# Patient Record
Sex: FEMALE | Race: WHITE | NOT HISPANIC OR LATINO | Employment: OTHER | URBAN - METROPOLITAN AREA
[De-identification: names, ages, dates, MRNs, and addresses within clinical notes are randomized per-mention and may not be internally consistent; named-entity substitution may affect disease eponyms.]

---

## 2018-01-25 ENCOUNTER — CONVERSION ENCOUNTER (OUTPATIENT)
Dept: FAMILY MEDICINE CLINIC | Facility: CLINIC | Age: 83
End: 2018-01-25

## 2018-01-25 ENCOUNTER — OFFICE VISIT CONVERTED (OUTPATIENT)
Dept: FAMILY MEDICINE CLINIC | Facility: CLINIC | Age: 83
End: 2018-01-25
Attending: FAMILY MEDICINE

## 2018-02-02 ENCOUNTER — OFFICE VISIT CONVERTED (OUTPATIENT)
Dept: FAMILY MEDICINE CLINIC | Facility: CLINIC | Age: 83
End: 2018-02-02
Attending: FAMILY MEDICINE

## 2018-03-09 ENCOUNTER — OFFICE VISIT CONVERTED (OUTPATIENT)
Dept: FAMILY MEDICINE CLINIC | Facility: CLINIC | Age: 83
End: 2018-03-09
Attending: NURSE PRACTITIONER

## 2018-08-24 ENCOUNTER — OFFICE VISIT CONVERTED (OUTPATIENT)
Dept: FAMILY MEDICINE CLINIC | Facility: CLINIC | Age: 83
End: 2018-08-24
Attending: FAMILY MEDICINE

## 2019-07-15 ENCOUNTER — HOSPITAL ENCOUNTER (OUTPATIENT)
Dept: GENERAL RADIOLOGY | Facility: HOSPITAL | Age: 84
Discharge: HOME | End: 2019-07-15
Attending: NURSE PRACTITIONER

## 2019-07-15 ENCOUNTER — OFFICE VISIT CONVERTED (OUTPATIENT)
Dept: FAMILY MEDICINE CLINIC | Facility: CLINIC | Age: 84
End: 2019-07-15
Attending: NURSE PRACTITIONER

## 2019-07-15 ENCOUNTER — HOSPITAL ENCOUNTER (OUTPATIENT)
Dept: LAB | Facility: HOSPITAL | Age: 84
Discharge: HOME | End: 2019-07-15
Attending: NURSE PRACTITIONER

## 2019-07-15 LAB
ALBUMIN SERPL-MCNC: 3.9 G/DL (ref 3.5–5)
ALBUMIN/GLOB SERPL: 1.4 {RATIO} (ref 1.4–2.6)
ALP SERPL-CCNC: 88 U/L (ref 38–185)
ALT SERPL-CCNC: 14 U/L (ref 10–40)
ANION GAP SERPL CALC-SCNC: 17 MMOL/L (ref 8–19)
AST SERPL-CCNC: 23 U/L (ref 15–50)
BASOPHILS # BLD AUTO: 0.02 10*3/UL (ref 0–0.2)
BASOPHILS NFR BLD AUTO: 0.3 % (ref 0–3)
BILIRUB SERPL-MCNC: 0.49 MG/DL (ref 0.2–1.3)
BUN SERPL-MCNC: 16 MG/DL (ref 5–25)
BUN/CREAT SERPL: 16 {RATIO} (ref 6–20)
CALCIUM SERPL-MCNC: 9.1 MG/DL (ref 8.7–10.4)
CHLORIDE SERPL-SCNC: 100 MMOL/L (ref 99–111)
CONV ABS IMM GRAN: 0.02 10*3/UL (ref 0–0.2)
CONV CO2: 26 MMOL/L (ref 22–32)
CONV IMMATURE GRAN: 0.3 % (ref 0–1.8)
CONV TOTAL PROTEIN: 6.7 G/DL (ref 6.3–8.2)
CREAT UR-MCNC: 1.02 MG/DL (ref 0.5–0.9)
DEPRECATED RDW RBC AUTO: 47.4 FL (ref 36.4–46.3)
EOSINOPHIL # BLD AUTO: 0.16 10*3/UL (ref 0–0.7)
EOSINOPHIL # BLD AUTO: 2.4 % (ref 0–7)
ERYTHROCYTE [DISTWIDTH] IN BLOOD BY AUTOMATED COUNT: 13.2 % (ref 11.7–14.4)
GFR SERPLBLD BASED ON 1.73 SQ M-ARVRAT: 47 ML/MIN/{1.73_M2}
GLOBULIN UR ELPH-MCNC: 2.8 G/DL (ref 2–3.5)
GLUCOSE SERPL-MCNC: 101 MG/DL (ref 65–99)
HBA1C MFR BLD: 13.6 G/DL (ref 12–16)
HCT VFR BLD AUTO: 40.3 % (ref 37–47)
LYMPHOCYTES # BLD AUTO: 1.64 10*3/UL (ref 1–5)
MCH RBC QN AUTO: 32.7 PG (ref 27–31)
MCHC RBC AUTO-ENTMCNC: 33.7 G/DL (ref 33–37)
MCV RBC AUTO: 96.9 FL (ref 81–99)
MONOCYTES # BLD AUTO: 0.5 10*3/UL (ref 0.2–1.2)
MONOCYTES NFR BLD AUTO: 7.5 % (ref 3–10)
NEUTROPHILS # BLD AUTO: 4.32 10*3/UL (ref 2–8)
NEUTROPHILS NFR BLD AUTO: 64.9 % (ref 30–85)
NRBC CBCN: 0 % (ref 0–0.7)
OSMOLALITY SERPL CALC.SUM OF ELEC: 289 MOSM/KG (ref 273–304)
PLATELET # BLD AUTO: 244 10*3/UL (ref 130–400)
PMV BLD AUTO: 9.8 FL (ref 9.4–12.3)
POTASSIUM SERPL-SCNC: 4.1 MMOL/L (ref 3.5–5.3)
RBC # BLD AUTO: 4.16 10*6/UL (ref 4.2–5.4)
SODIUM SERPL-SCNC: 139 MMOL/L (ref 135–147)
T4 FREE SERPL-MCNC: 1.5 NG/DL (ref 0.9–1.8)
TSH SERPL-ACNC: 2.89 M[IU]/L (ref 0.27–4.2)
VARIANT LYMPHS NFR BLD MANUAL: 24.6 % (ref 20–45)
WBC # BLD AUTO: 6.66 10*3/UL (ref 4.8–10.8)

## 2019-08-13 ENCOUNTER — CONVERSION ENCOUNTER (OUTPATIENT)
Dept: FAMILY MEDICINE CLINIC | Facility: CLINIC | Age: 84
End: 2019-08-13

## 2019-08-13 ENCOUNTER — HOSPITAL ENCOUNTER (OUTPATIENT)
Dept: FAMILY MEDICINE CLINIC | Facility: CLINIC | Age: 84
Discharge: HOME | End: 2019-08-13
Attending: NURSE PRACTITIONER

## 2019-08-13 ENCOUNTER — OFFICE VISIT CONVERTED (OUTPATIENT)
Dept: FAMILY MEDICINE CLINIC | Facility: CLINIC | Age: 84
End: 2019-08-13
Attending: NURSE PRACTITIONER

## 2019-08-15 LAB — BACTERIA UR CULT: NORMAL

## 2021-01-01 ENCOUNTER — TELEPHONE (OUTPATIENT)
Dept: FAMILY MEDICINE CLINIC | Facility: CLINIC | Age: 86
End: 2021-01-01

## 2021-01-01 ENCOUNTER — HOSPITAL ENCOUNTER (EMERGENCY)
Facility: HOSPITAL | Age: 86
End: 2021-07-06
Attending: EMERGENCY MEDICINE | Admitting: EMERGENCY MEDICINE

## 2021-01-01 VITALS
DIASTOLIC BLOOD PRESSURE: 74 MMHG | TEMPERATURE: 96.9 F | OXYGEN SATURATION: 98 % | WEIGHT: 133.5 LBS | HEART RATE: 68 BPM | SYSTOLIC BLOOD PRESSURE: 116 MMHG | RESPIRATION RATE: 17 BRPM

## 2021-01-01 VITALS
WEIGHT: 133.5 LBS | TEMPERATURE: 97 F | HEIGHT: 64 IN | OXYGEN SATURATION: 96 % | HEART RATE: 72 BPM | SYSTOLIC BLOOD PRESSURE: 107 MMHG | DIASTOLIC BLOOD PRESSURE: 74 MMHG | BODY MASS INDEX: 22.79 KG/M2

## 2021-01-01 VITALS
TEMPERATURE: 96.6 F | SYSTOLIC BLOOD PRESSURE: 119 MMHG | DIASTOLIC BLOOD PRESSURE: 62 MMHG | BODY MASS INDEX: 23.13 KG/M2 | HEIGHT: 64 IN | HEART RATE: 82 BPM | WEIGHT: 135.5 LBS | OXYGEN SATURATION: 97 % | RESPIRATION RATE: 11 BRPM

## 2021-01-01 VITALS
DIASTOLIC BLOOD PRESSURE: 58 MMHG | HEIGHT: 64 IN | TEMPERATURE: 96.1 F | BODY MASS INDEX: 22.56 KG/M2 | SYSTOLIC BLOOD PRESSURE: 93 MMHG | OXYGEN SATURATION: 97 % | WEIGHT: 132.12 LBS | HEART RATE: 75 BPM

## 2021-01-01 VITALS
HEIGHT: 64 IN | HEART RATE: 73 BPM | SYSTOLIC BLOOD PRESSURE: 86 MMHG | BODY MASS INDEX: 22.78 KG/M2 | OXYGEN SATURATION: 97 % | TEMPERATURE: 96.7 F | WEIGHT: 133.44 LBS | DIASTOLIC BLOOD PRESSURE: 66 MMHG

## 2021-01-01 VITALS
SYSTOLIC BLOOD PRESSURE: 115 MMHG | WEIGHT: 132.31 LBS | RESPIRATION RATE: 16 BRPM | OXYGEN SATURATION: 96 % | DIASTOLIC BLOOD PRESSURE: 68 MMHG | HEART RATE: 77 BPM | BODY MASS INDEX: 22.59 KG/M2 | HEIGHT: 64 IN | TEMPERATURE: 95.6 F

## 2021-01-01 VITALS — HEART RATE: 90 BPM | DIASTOLIC BLOOD PRESSURE: 58 MMHG | SYSTOLIC BLOOD PRESSURE: 90 MMHG

## 2021-01-01 DIAGNOSIS — I25.118 CORONARY ARTERY DISEASE INVOLVING NATIVE HEART WITH OTHER FORM OF ANGINA PECTORIS, UNSPECIFIED VESSEL OR LESION TYPE (HCC): ICD-10-CM

## 2021-01-01 DIAGNOSIS — I46.9 CARDIOPULMONARY ARREST (HCC): Primary | ICD-10-CM

## 2021-01-01 PROCEDURE — 25010000002 ATROPINE PER 0.01 MG

## 2021-01-01 PROCEDURE — 92950 HEART/LUNG RESUSCITATION CPR: CPT

## 2021-01-01 PROCEDURE — 99284 EMERGENCY DEPT VISIT MOD MDM: CPT

## 2021-01-01 PROCEDURE — 31500 INSERT EMERGENCY AIRWAY: CPT

## 2021-01-01 PROCEDURE — 25010000002 EPINEPHRINE 1 MG/10ML SOLUTION PREFILLED SYRINGE: Performed by: EMERGENCY MEDICINE

## 2021-01-01 PROCEDURE — 25010000002 ATROPINE PER 0.01 MG: Performed by: EMERGENCY MEDICINE

## 2021-01-01 PROCEDURE — 99283 EMERGENCY DEPT VISIT LOW MDM: CPT

## 2021-01-01 RX ORDER — ATROPINE SULFATE 1 MG/ML
INJECTION, SOLUTION INTRAMUSCULAR; INTRAVENOUS; SUBCUTANEOUS
Status: COMPLETED | OUTPATIENT
Start: 2021-01-01 | End: 2021-01-01

## 2021-01-01 RX ORDER — EPINEPHRINE 0.1 MG/ML
SYRINGE (ML) INJECTION
Status: COMPLETED | OUTPATIENT
Start: 2021-01-01 | End: 2021-01-01

## 2021-01-01 RX ORDER — LEVOTHYROXINE SODIUM 0.07 MG/1
75 TABLET ORAL DAILY
Qty: 30 TABLET | Refills: 4 | Status: SHIPPED | OUTPATIENT
Start: 2021-01-01

## 2021-01-01 RX ORDER — PANTOPRAZOLE SODIUM 20 MG/1
20 TABLET, DELAYED RELEASE ORAL DAILY
Qty: 30 TABLET | Refills: 4 | Status: SHIPPED | OUTPATIENT
Start: 2021-01-01

## 2021-01-01 RX ORDER — CALCIUM CHLORIDE 100 MG/ML
INJECTION INTRAVENOUS; INTRAVENTRICULAR
Status: COMPLETED | OUTPATIENT
Start: 2021-01-01 | End: 2021-01-01

## 2021-01-01 RX ADMIN — EPINEPHRINE 1 MG: 0.1 INJECTION, SOLUTION ENDOTRACHEAL; INTRACARDIAC; INTRAVENOUS at 17:44

## 2021-01-01 RX ADMIN — EPINEPHRINE 1 MG: 0.1 INJECTION, SOLUTION ENDOTRACHEAL; INTRACARDIAC; INTRAVENOUS at 17:37

## 2021-01-01 RX ADMIN — ATROPINE SULFATE 1 MG: 1 INJECTION, SOLUTION INTRAMUSCULAR; INTRAVENOUS; SUBCUTANEOUS at 17:34

## 2021-01-01 RX ADMIN — CALCIUM CHLORIDE 1 G: 100 INJECTION INTRAVENOUS; INTRAVENTRICULAR at 17:45

## 2021-01-01 RX ADMIN — SODIUM BICARBONATE 50 MEQ: 84 INJECTION, SOLUTION INTRAVENOUS at 17:42

## 2021-01-01 RX ADMIN — EPINEPHRINE 1 MG: 0.1 INJECTION, SOLUTION ENDOTRACHEAL; INTRACARDIAC; INTRAVENOUS at 17:35

## 2021-01-01 RX ADMIN — CALCIUM CHLORIDE 1 G: 100 INJECTION INTRAVENOUS; INTRAVENTRICULAR at 17:38

## 2021-01-01 RX ADMIN — EPINEPHRINE 1 MG: 0.1 INJECTION, SOLUTION ENDOTRACHEAL; INTRACARDIAC; INTRAVENOUS at 17:42

## 2021-01-01 RX ADMIN — SODIUM BICARBONATE 50 MEQ: 84 INJECTION, SOLUTION INTRAVENOUS at 17:38

## 2021-06-17 NOTE — TELEPHONE ENCOUNTER
Caller: Mari Hickey    Relationship: Self    Best call back [phone]  What medication are you requesting:   PORTONIX   THYROID MEDICATION       If a prescription is needed, what is your preferred pharmacy and phone number: [facility] [place] [address] [facility] [address] - [phone] [facility] [phone] FX

## 2021-07-06 NOTE — ED NOTES
Time: 17:20 EDT  Arrived by: EMS  Chief Complaint: cardiac arrest  History provided by: EMS  History is limited by: N/A    History of Present Illness:  Patient is a 89 y.o. year old female that presents to the emergency department with cardiac arrest.  EMS states that they were called out for chest pain, but when they arrived pt c/o abdominal pain to them. EMS reports pt has a history of heart problems. Pt was breathing and then it started to become shallow so they did 1 minute round of CPR. Pt was having agonal respirations and they couldn't feel a pulse so they tried intubating. Pt arrived to ED in [place] on LIZETH.     Pt daughter states that pt was complaining this morning of some chest pain and had an episode of vomiting.     Respiratory Arrest  Severity:  Severe  Onset quality:  Sudden  Timing:  Constant  Progression:  Unable to specify  Chronicity:  New  Relieved by:  Nothing  Worsened by:  Nothing  Ineffective treatments:  Nothing  Associated symptoms: shortness of breath    Associated symptoms: no chest pain, no cough, no diarrhea, no fever, no rash and no vomiting            Similar Symptoms Previously: no  Recently seen: no      Patient Care Team  Primary Care Provider: Provider, No Known      Past Medical History:     Allergies   Allergen Reactions   • Ciprofloxacin Nausea And Vomiting     History reviewed. No pertinent past medical history.  History reviewed. No pertinent surgical history.  History reviewed. No pertinent family history.    Home Medications:  Prior to Admission medications    Medication Sig Start Date End Date Taking? Authorizing Provider   levothyroxine (Synthroid) 75 MCG tablet Take 1 tablet by mouth Daily. 6/22/21   Arnaud Estrada MD   pantoprazole (Protonix) 20 MG EC tablet Take 1 tablet by mouth Daily. 6/22/21   Arnaud Estrada MD        Social History:   PT Alcohol use questions deferred to the physician. Drug use questions deferred to the physician. No history on file for tobacco  use.    Record Review:  I have reviewed the patient's records in [facility].     Review of Systems  Review of Systems   Constitutional: Negative for chills and fever.   HENT: Negative for nosebleeds.    Eyes: Negative for redness.   Respiratory: Positive for shortness of breath. Negative for cough.    Cardiovascular: Negative for chest pain.   Gastrointestinal: Negative for diarrhea and vomiting.   Genitourinary: Negative for dysuria and frequency.   Musculoskeletal: Negative for back pain and neck pain.   Skin: Negative for rash.   Neurological: Negative for seizures and syncope.        Physical Exam  There were no vitals taken for this visit.    Physical Exam  Vitals and nursing note reviewed.   Constitutional:       General: She is not in acute distress.     Comments: On LIZETH   HENT:      Head: Normocephalic and atraumatic.      Nose: Nose normal.      Mouth/Throat:      Mouth: Mucous membranes are moist.   Eyes:      General: No scleral icterus.  Cardiovascular:      Rate and Rhythm: Normal rate and regular rhythm.      Heart sounds: Normal heart sounds. No murmur heard.     Pulmonary:      Effort: No respiratory distress.      Breath sounds: Normal breath sounds.   Abdominal:      Palpations: Abdomen is soft.      Tenderness: There is no abdominal tenderness.      Hernia: No hernia is present.   Musculoskeletal:         General: No tenderness. Normal range of motion.      Cervical back: Normal range of motion and neck supple. No tenderness.      Right lower leg: No edema.      Left lower leg: No edema.   Skin:     General: Skin is warm and dry.   Neurological:      General: No focal deficit present.      Mental Status: Mental status is at baseline.      Sensory: No sensory deficit.      Motor: No weakness.   Psychiatric:         Behavior: Behavior normal.                  ED Course  There were no vitals taken for this visit.  No results found for this or any previous visit.  Medications   atropine injection (1 mg  Intravenous Given 21 173)   EPINEPHrine (ADRENALIN) injection (1 mg Intravenous Given 21 1744)   calcium chloride injection (1 g Intravenous Given 21 1745)   sodium bicarbonate injection 8.4% (50 mEq Intravenous Given 21 174)     No results found.    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 Information recorded by the scribe was done at my direction and has been verified and validated by me.        Yessy Real  21 1827       Wojciech Larson DO  21 1726

## 2021-07-06 NOTE — ED PROVIDER NOTES
Time: 03:17 EDT  Arrived by: EMS  Chief Complaint: cardiac arrest  History provided by: EMS  History is limited by: N/A    History of Present Illness:  Patient is a 89 y.o. year old female that presents to the emergency department with cardiac arrest. EMS states they were called out for chest pain but when they arrived the pt c/o abdominal pain. Pt started having shallow breathing at which time EMS gave 1 minute round of CPR. EMS put pt on LIZETH and tried to intubate. Pt has a history of heart problems.     Daughter states that pt was complaining earlier in the morning with chest pain and had vomited.       Respiratory Arrest  Severity:  Severe  Onset quality:  Sudden  Timing:  Constant  Progression:  Unchanged  Chronicity:  New  Relieved by:  Nothing  Worsened by:  Nothing  Associated symptoms: abdominal pain, chest pain, shortness of breath and vomiting (once)    Associated symptoms: no cough, no diarrhea, no fever and no rash            Similar Symptoms Previously: no  Recently seen: no      Patient Care Team  Primary Care Provider: Provider, No Known      Past Medical History:     Allergies   Allergen Reactions   • Ciprofloxacin Nausea And Vomiting     History reviewed. No pertinent past medical history.  History reviewed. No pertinent surgical history.  History reviewed. No pertinent family history.    Home Medications:  Prior to Admission medications    Medication Sig Start Date End Date Taking? Authorizing Provider   levothyroxine (Synthroid) 75 MCG tablet Take 1 tablet by mouth Daily. 6/22/21   Arnaud Estrada MD   pantoprazole (Protonix) 20 MG EC tablet Take 1 tablet by mouth Daily. 6/22/21   Arnaud Estrada MD        Social History:   PT Alcohol use questions deferred to the physician. Drug use questions deferred to the physician. No history on file for tobacco use.    Record Review:  I have reviewed the patient's records in [id number].     Review of Systems  Review of Systems   Constitutional: Negative for  chills and fever.   HENT: Negative for nosebleeds.    Eyes: Negative for redness.   Respiratory: Positive for shortness of breath. Negative for cough.    Cardiovascular: Positive for chest pain.   Gastrointestinal: Positive for abdominal pain and vomiting (once). Negative for diarrhea.   Genitourinary: Negative for dysuria and frequency.   Musculoskeletal: Negative for back pain and neck pain.   Skin: Negative for rash.   Neurological: Negative for seizures and syncope.        Physical Exam  There were no vitals taken for this visit.    Physical Exam  Vitals and nursing note reviewed.   Constitutional:       General: She is not in acute distress.     Interventions: Face mask in place.      Comments: On LIZETH. Unresponsive upon arrival.   HENT:      Head: Normocephalic and atraumatic.      Nose: Nose normal.      Mouth/Throat:      Comments: No gag reflex.   Eyes:      General: No scleral icterus.     Comments: Pupils fixed and dilated.    Cardiovascular:      Heart sounds: No murmur heard.        Comments: No heart sounds to auscultation.   Pulmonary:      Comments: No lung sounds to auscultation. Bagged by EMS through ventilation mask.   Abdominal:      General: There is no distension.      Palpations: Abdomen is soft.      Tenderness: There is no abdominal tenderness.      Hernia: No hernia is present.   Musculoskeletal:         General: No tenderness. Normal range of motion.      Cervical back: Neck supple. No tenderness.      Right lower leg: No edema.      Left lower leg: No edema.   Skin:     General: Skin is warm and dry.      Coloration: Skin is pale.   Neurological:      Mental Status: She is unresponsive.      GCS: GCS eye subscore is 1. GCS verbal subscore is 1. GCS motor subscore is 1.      Sensory: No sensory deficit.      Motor: No weakness.   Psychiatric:         Behavior: Behavior normal.                  ED Course  There were no vitals taken for this visit.  No results found for this or any previous  visit.  Medications   atropine injection (1 mg Intravenous Given 21)   EPINEPHrine (ADRENALIN) injection (1 mg Intravenous Given 21 174)   calcium chloride injection (1 g Intravenous Given 21)   sodium bicarbonate injection 8.4% (50 mEq Intravenous Given 21)     No results found.    Procedures/EKGs:  Intubation    Date/Time: 2021 6:33 PM  Performed by: Wojciech Larson DO  Authorized by: Wojciech Larson DO     Consent:     Consent obtained:  Emergent situation  Pre-procedure details:     Patient status:  Unresponsive  Procedure details:     CPR in progress: yes      Intubation method:  Oral    Tube size (mm):  6.5    Number of attempts:  1    Tube visualized through cords: yes    Placement assessment:     ETT to lip:  22    Tube secured with:  Adhesive tape              Medical Decision Making:                     MDM  Number of Diagnoses or Management Options  Cardiopulmonary arrest (CMS/HCC)  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  The daughter states that the patient did not want to be placed on life support or resuscitated.  The patient's daughter request that the ET tube be removed and the patient be allowed to die naturally.  The patient daughter was at bedside when the patient passed.  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  Information recorded by the scribe was done at my direction and has been verified and validated by me.        Yessy Real  07/06/21 0208       Wojciech Larson DO  07/13/21 [id number]